# Patient Record
Sex: MALE | URBAN - METROPOLITAN AREA
[De-identification: names, ages, dates, MRNs, and addresses within clinical notes are randomized per-mention and may not be internally consistent; named-entity substitution may affect disease eponyms.]

---

## 2019-03-26 ENCOUNTER — EMERGENCY (EMERGENCY)
Facility: HOSPITAL | Age: 24
LOS: 1 days | Discharge: HOME | End: 2019-03-26
Admitting: EMERGENCY MEDICINE
Payer: SELF-PAY

## 2019-03-26 VITALS
DIASTOLIC BLOOD PRESSURE: 81 MMHG | TEMPERATURE: 98 F | HEART RATE: 77 BPM | OXYGEN SATURATION: 100 % | RESPIRATION RATE: 15 BRPM | SYSTOLIC BLOOD PRESSURE: 137 MMHG

## 2019-03-26 PROCEDURE — 99282 EMERGENCY DEPT VISIT SF MDM: CPT

## 2019-03-26 NOTE — ED PROVIDER NOTE - CLINICAL SUMMARY MEDICAL DECISION MAKING FREE TEXT BOX
25 y/o male w/ diarrhea- offered blood work but refused. Pt advised that he needs to f/u with GI for possible IBS. stable for dc.

## 2019-03-26 NOTE — ED PROVIDER NOTE - OBJECTIVE STATEMENT
23 y/o male no pmh c/o diarrhea x2 weeks. Pt admits to watery diarrhea daily x2 weeks. Pt admits to 2-3 episodes per day. Pt admits to similar symptoms throughout his life. He goes through constipation and diarrhea cycles. Pt was evaluate by GI several years ago but has not f/u since. Pt denies chest pain, sob, abd pain, n/v, numbness, tingling, weakness, dizziness, syncope, fever or chills. Pt is tolerating PO and drinking plenty of liquids.

## 2019-03-26 NOTE — ED ADULT TRIAGE NOTE - CHIEF COMPLAINT QUOTE
Pt c/o diarrhea for the past 2 weeks, non-bloody.  Pt endorses a "bloating" feeling but no abdominal pain.  Denies any nausea/vomiting.  Was evaluated by PMD and given an anti-diarrheal without relief.  Denies any PMHx but states he thinks he has IBS, undiagnosed because "I don't have the time to get tested for it."